# Patient Record
Sex: FEMALE | Race: WHITE | NOT HISPANIC OR LATINO | URBAN - METROPOLITAN AREA
[De-identification: names, ages, dates, MRNs, and addresses within clinical notes are randomized per-mention and may not be internally consistent; named-entity substitution may affect disease eponyms.]

---

## 2019-03-22 ENCOUNTER — SURGERY - HEALTHEAST (OUTPATIENT)
Dept: CARDIOLOGY | Facility: CLINIC | Age: 84
End: 2019-03-22

## 2019-03-22 ASSESSMENT — MIFFLIN-ST. JEOR: SCORE: 1089.34

## 2019-03-23 ASSESSMENT — MIFFLIN-ST. JEOR: SCORE: 1082.25

## 2019-03-25 ENCOUNTER — COMMUNICATION - HEALTHEAST (OUTPATIENT)
Dept: SCHEDULING | Facility: CLINIC | Age: 84
End: 2019-03-25

## 2019-03-25 ENCOUNTER — RECORDS - HEALTHEAST (OUTPATIENT)
Dept: SCHEDULING | Facility: CLINIC | Age: 84
End: 2019-03-25

## 2021-05-27 NOTE — TELEPHONE ENCOUNTER
[facility] resident Viri Paez treating pt at [facility] requesting discharge summary. States he was unable to reach anyone in [facility]. Treating pt at [facility] now and needing cardiology conclusions and lab results for WBC.     Writer advised Viri regarding cardiology cath conclusion and last WBC.     Viri verbalizes understanding and states he will contact [facility] in the morning for additional information.       Reason for Disposition    Health Information question, no triage required and triager able to answer question    Protocols used: INFORMATION ONLY CALL-A-

## 2021-06-02 VITALS — HEIGHT: 62 IN | WEIGHT: 153 LBS | BODY MASS INDEX: 28.16 KG/M2

## 2021-06-16 PROBLEM — E83.42 HYPOMAGNESEMIA: Status: ACTIVE | Noted: 2019-03-19

## 2021-06-16 PROBLEM — K90.829 SHORT BOWEL SYNDROME: Status: ACTIVE | Noted: 2019-03-22

## 2021-06-16 PROBLEM — I44.7 LEFT BUNDLE BRANCH BLOCK (LBBB): Chronic | Status: ACTIVE | Noted: 2019-03-19

## 2021-06-16 PROBLEM — Z98.890 STATUS POST CORONARY ANGIOGRAM: Status: ACTIVE | Noted: 2019-03-23

## 2021-06-16 PROBLEM — K50.119 CROHN'S DISEASE OF LARGE INTESTINE WITH COMPLICATION (H): Status: ACTIVE | Noted: 2019-03-22

## 2021-06-16 PROBLEM — I25.10 CORONARY ARTERY CALCIFICATION SEEN ON CT SCAN: Chronic | Status: ACTIVE | Noted: 2019-03-20

## 2021-06-16 PROBLEM — R94.39 ABNORMAL NUCLEAR STRESS TEST: Status: ACTIVE | Noted: 2019-03-21

## 2021-06-16 PROBLEM — D64.9 ANEMIA: Status: ACTIVE | Noted: 2019-03-21

## 2021-06-16 PROBLEM — R06.00 DYSPNEA: Status: ACTIVE | Noted: 2019-03-22

## 2021-06-16 PROBLEM — N18.30 CKD (CHRONIC KIDNEY DISEASE) STAGE 3, GFR 30-59 ML/MIN (H): Status: ACTIVE | Noted: 2019-03-22

## 2021-07-14 PROBLEM — J96.01 ACUTE RESPIRATORY FAILURE WITH HYPOXIA (H): Status: RESOLVED | Noted: 2019-03-19 | Resolved: 2019-05-23
